# Patient Record
Sex: MALE | Race: WHITE | NOT HISPANIC OR LATINO | Employment: FULL TIME | URBAN - METROPOLITAN AREA
[De-identification: names, ages, dates, MRNs, and addresses within clinical notes are randomized per-mention and may not be internally consistent; named-entity substitution may affect disease eponyms.]

---

## 2023-12-14 ENCOUNTER — EVALUATION (OUTPATIENT)
Dept: OCCUPATIONAL THERAPY | Facility: CLINIC | Age: 58
End: 2023-12-14
Payer: COMMERCIAL

## 2023-12-14 DIAGNOSIS — M79.641 HAND PAIN, RIGHT: Primary | ICD-10-CM

## 2023-12-14 PROBLEM — M25.641 JOINT STIFFNESS OF HAND, RIGHT: Status: ACTIVE | Noted: 2023-12-14

## 2023-12-14 PROCEDURE — 97530 THERAPEUTIC ACTIVITIES: CPT | Mod: GO

## 2023-12-14 PROCEDURE — 97165 OT EVAL LOW COMPLEX 30 MIN: CPT | Mod: GO

## 2023-12-14 PROCEDURE — 97110 THERAPEUTIC EXERCISES: CPT | Mod: GO

## 2023-12-14 NOTE — PROGRESS NOTES
"Occupational Therapy    Evaluation/Treatment    Patient Name: Dwaine Romero  MRN: [id number]  : 1965  Today's Date: 23     Time Calculation  Start Time: 845  Stop Time: 934  Time Calculation (min): 49 min    Assessment:     OT Assessment Results: Decreased ADL status, Decreased upper extremity range of motion, Decreased upper extremity strength, Decreased sensation, Decreased fine motor control, Decreased IADLs  Plan:     Frequency: 1-2 x a week  Duration: 8 weeks  Rehab Potential: Fair  Plan of Care Agreement: Patient    Subjective   Current Problem:  1. Hand pain, right  Follow Up In Occupational Therapy        General:      General  Reason for Referral: Extensor tendon repair zone 3-4  Referred By: Espinoza Posey PA-C  General Comment: R middle finger tenolysis, R MF Extensor tendon repair, R MF radial digital nerve repair, R RF extensor tendon repair, R RF revision amputation. R IF revision amputation. See EMR for further info.  Precautions:  STEADI Fall Risk Score (The score of 4 or more indicates an increased risk of falling): Surgery 23  UE Weight Bearing Status: Right Non-Weight Bearing  Splinting:  (Pt wearing digit extension splint for entire hand, wrist and forearm.)  Precautions Comment: Extensor tendon protocol.    Pain:  Pain Assessment  Pain Assessment:  (No specific number given. \"it doesnt hurt anything like it did.)    Objective   Cognition:WNL              Home Living:  Lives With:  (family)  Home Access: No concerns  Prior Function:  Level of [place]: Independent with ADLs and functional transfers  Hand Dominance: Right  IADL History:  Occupation:  (Saw )  ADL:  Eating Deficit:  (Unable to use both hands for cutting food.)  Bathing Deficit: Increased time to complete  (Compensation.)  UE Dressing Deficit:  (Assist with buttoning)  LE Dressing Deficit: Increased time to complete (Assist to tie shoes)  Functional Deficit: Increased time to complete   "       Therapy/Activity: Therapeutic Exercise  Therapeutic Exercise Activity 1: Pt educated on completing MP flexion with IP's extended and while wrist was slightly extended, then returning to MP extension passively. x 10  Therapeutic Exercise Activity 2: DIP flexion attempted with MP and PIP straight on long finger, however he was unable to move at this time. Will reattempt.  Therapeutic Exercise Activity 3: MP short arch extension over a towel was completed over a towel roll x 10.    Therapeutic Activity  Therapeutic Activity 1: Pt's surgical fingers were debrided of dead tissue with scissors and tweezers. no drainage noted except a small amount of blood following debridement.  Therapeutic Activity 2: Pt briefly educated on scar massage and instructed to complete 3x a day at 15 min each time.  Therapeutic Activity 3: Edema management was discussed and should include elevation, and icing.              Sensation:  Sensation Comment: Sensation impaired  Strength:  Strength Comments: Strength in hand, and wrist impaired.     Extremities: R wrist and hand impaired, L WNL  Outcome Measures: OT Adult Other Outcome Measures  Other Outcome Measures: 100 Dash        OP EDUCATION:  Education  Individual(s) Educated: Patient  Education Provided: Orthotics wearing schedule and precautions, Diagnosis & Precautions, POC discussed and agreed upon, Risk and benefits of OT discussed with patient or other  Home Program: Handout issued, AROM, PROM  Patient Response to Education: Patient/Caregiver Verbalized Understanding of Information    Goals:  Active       OT Problem       Pt will demonstrate in improve in function by improving to 20.0 on the quick dash to signify increased independence with ADL'S with decreased pain.         Start:  12/14/23    Expected End:  03/14/24            Pt will be independent with HEP including wound management, scar management and edema management         Start:  12/14/23    Expected End:  03/14/24             Patient will improve finger ROM in order to perform self-care, household, work and or leisure tasks, Right Finger ROM Goal (Total Active Motion)      Index  125   Degrees, Ring  130      Degrees, Long   160    Degrees, Small   225    Degrees.        Start:  12/14/23    Expected End:  03/14/24            PATIENT WILL REPORT PAIN OF 2/10 DEMONSTRATING A REDUCTION OF OVERALL PAIN       Start:  12/14/23    Expected End:  03/14/24            Patient will improve hand strength in order to perform self-care, household, work and or leisure tasks Right  Hand Strength Goal;     35      #, Key Pinch   10      #       Start:  12/14/23    Expected End:  03/14/24            Patient will improve strengthening in order to perform self-care, household, work and or leisure tasks, Right/Left Wrist Strength Goal   Extension   /5,  Flexion    /5, Ulnar deviation /5, Radial deviation   /5  5/5 globally       Start:  12/14/23    Expected End:  03/14/24            Patient will improve wrist range of motion in order to perform self-care, household, work and or leisure tasks. Wrist Extension 55,   Flexion 55 ,               Start:  12/14/23    Expected End:  03/14/24            PATIENT WILL DEMONSTRATE ABILITY TO FOLLOW extensor tendon repair PRECUATIONS INDEPENDENTLY       Start:  12/14/23    Expected End:  03/14/24

## 2023-12-20 ENCOUNTER — TREATMENT (OUTPATIENT)
Dept: OCCUPATIONAL THERAPY | Facility: CLINIC | Age: 58
End: 2023-12-20
Payer: COMMERCIAL

## 2023-12-20 DIAGNOSIS — M79.641 HAND PAIN, RIGHT: ICD-10-CM

## 2023-12-20 PROCEDURE — 97140 MANUAL THERAPY 1/> REGIONS: CPT | Mod: GO

## 2023-12-20 PROCEDURE — 97110 THERAPEUTIC EXERCISES: CPT | Mod: GO

## 2023-12-21 NOTE — PROGRESS NOTES
Occupational Therapy  Occupational Therapy Treatment    Patient Name: Dwaine Romero  MRN: [id number]  Today's Date: 12/21/2023  Time Calculation  Start Time: 0945  Stop Time: 1028  Time Calculation (min): 43 min    Insurance:  Authorization   No        Visit #- 2    Current Problem  1. Hand pain, right  Follow Up In Occupational Therapy          Precautions  Precautions  UE Weight Bearing Status: Right Non-Weight Bearing  Splinting:  (Pt continue to be wearing digit extension splint for entire hand, wrist and forearm.  Will remove for HEP and awake rest.)  Precautions Comment: Extensor tendon protocol.      Subjective:   Patient reports: See pain assessment    Performing HEP?: Yes    Pain  Pain Assessment:  (No # given, but reports significantly better than it was the week before.  Also reports fingers are moving better.)      Objective:   Physical Observation:   Multiple scars to fingers and hands.  IF wound open to air, granulation tissue (smooth) with contraction noted.  Straps on his splint were falling apart.  Issued replacement, along with stockingnette to go under his splint.    Treatments:       Therapeutic Exercise:  15 min  Therapeutic Exercise  Therapeutic Exercise Activity 1: Therapist demonstrated exercise for completing MP flexion with IP's extended and while wrist was slightly extended, then returning to MP extension ACTIVELY. x 10  Therapeutic Exercise Activity 2: DIP flexion re-attempted with MP and PIP straight on long finger, however he was unable to move at this time. Will reattempt.  Therapeutic Exercise Activity 3: MP short arch extension over a towel  x 10.  Therapeutic Exercise Activity 4: Hand flat on table with active slight lift and move radially, with a 5 second hold, 10.  Therapeutic Exercise Activity 5: Wrist gentle flexion with relaxed fingers, x10, no holds.  Therapeutic Exercise Activity 6: Wrist extension with 5 second hold, x10.    Manual Therapy:    28 min  Manual  "Therapy  Manual Therapy Performed: Yes  Manual Therapy Activity 1: Desensitization techniques instructed, demonstrated and patient understood activities including tapping, vibration and rubbing the hyperrsensitive area on palm, near proximal to IF.  Handout issued, copy scanned.  Manual Therapy Activity 2: [place] for Relaxation fibrosis, secondary to increased edema (remains in all his fingers).    Therapeutic Activity:    min  Therapeutic Activity  Therapeutic Activity 1: Pt's index finger biologic film dressing \"fell off\" per patient.  Granulation tissue, with contraction noted.  Wound is clean, and has scant to zero drainage.  No intervention provided, other than issueing new sleeves for under his orthosis.  Therapeutic Activity 3: Edema management was again discussed and should include elevation, and icing, and even alternating between heat and ice.        Assessment:     Patient completed therapy this date with some difficulty, and is progressing in therapy.  Improvement noted in patient's: Range of motion  Self care  Home management  pain and edema.    Plan:   Plan to continue with current plan of care.       Active       OT Problem       Pt will demonstrate in improve in function by improving to 20.0 on the quick dash to signify increased independence with ADL'S with decreased pain.         Start:  12/14/23    Expected End:  03/14/24            Pt will be independent with HEP including wound management, scar management and edema management         Start:  12/14/23    Expected End:  03/14/24            Patient will improve finger ROM in order to perform self-care, household, work and or leisure tasks, Right Finger ROM Goal (Total Active Motion)      Index  125   Degrees, Ring  130      Degrees, Long   160    Degrees, Small   225    Degrees.        Start:  12/14/23    Expected End:  03/14/24            PATIENT WILL REPORT PAIN OF 2/10 DEMONSTRATING A REDUCTION OF OVERALL PAIN       Start:  12/14/23    Expected " End:  03/14/24            Patient will improve hand strength in order to perform self-care, household, work and or leisure tasks Right  Hand Strength Goal;     35      #, Key Pinch   10      #       Start:  12/14/23    Expected End:  03/14/24            Patient will improve strengthening in order to perform self-care, household, work and or leisure tasks, Right/Left Wrist Strength Goal   Extension   /5,  Flexion    /5, Ulnar deviation /5, Radial deviation   /5  5/5 globally       Start:  12/14/23    Expected End:  03/14/24            Patient will improve wrist range of motion in order to perform self-care, household, work and or leisure tasks. Wrist Extension 55,   Flexion 55 ,               Start:  12/14/23    Expected End:  03/14/24            PATIENT WILL DEMONSTRATE ABILITY TO FOLLOW extensor tendon repair PRECUATIONS INDEPENDENTLY       Start:  12/14/23    Expected End:  03/14/24                  Sirena Napoles OT

## 2024-04-11 ENCOUNTER — DOCUMENTATION (OUTPATIENT)
Dept: OCCUPATIONAL THERAPY | Facility: CLINIC | Age: 59
End: 2024-04-11
Payer: COMMERCIAL

## 2024-04-11 DIAGNOSIS — M79.641 HAND PAIN, RIGHT: Primary | ICD-10-CM

## 2024-04-11 NOTE — PROGRESS NOTES
Occupational Therapy    Occupational Therapy Discharge Report    Patient Name: Dwaine Romero  MRN: [id number]  Today's Date: 4/11/2024           Initial Eval: 12-4-23  Attended Visits: 2  Referring Physician:Dr. Doran   Referring Diagnosis: Extensor Tendon repair.        Areas Addressed in Therapy:  Pt's ROM, hypersensitivity and wound were addressed.        Reason for discharge:      Pt is discharged due to not scheduling further appointments.

## 2024-11-26 ENCOUNTER — TELEPHONE (OUTPATIENT)
Dept: PRIMARY CARE | Facility: CLINIC | Age: 59
End: 2024-11-26
Payer: COMMERCIAL

## 2024-11-26 NOTE — TELEPHONE ENCOUNTER
He was a [facility] patient so can go to [facility] or [facility]- Has not been seen for about 2 years    I believe it was his sister who called and said that he is having some mental issues so wants to get him checked out and started on medication- asked for a phone appt    74

## 2024-11-27 ENCOUNTER — TELEPHONE (OUTPATIENT)
Dept: PRIMARY CARE | Facility: CLINIC | Age: 59
End: 2024-11-27
Payer: COMMERCIAL

## 2024-11-27 NOTE — TELEPHONE ENCOUNTER
Was an appt made for this marco? Because they called again today asking for a appointment but it says that terrell informed them but there is no appt scheduled?

## 2024-12-02 ENCOUNTER — OFFICE VISIT (OUTPATIENT)
Dept: PRIMARY CARE | Facility: CLINIC | Age: 59
End: 2024-12-02
Payer: COMMERCIAL

## 2024-12-02 VITALS
HEART RATE: 102 BPM | HEIGHT: 70 IN | DIASTOLIC BLOOD PRESSURE: 88 MMHG | WEIGHT: 231 LBS | OXYGEN SATURATION: 98 % | SYSTOLIC BLOOD PRESSURE: 138 MMHG | BODY MASS INDEX: 33.07 KG/M2

## 2024-12-02 DIAGNOSIS — F33.41 RECURRENT MAJOR DEPRESSIVE DISORDER, IN PARTIAL REMISSION (CMS-HCC): Primary | ICD-10-CM

## 2024-12-02 PROBLEM — S68.624A PARTIAL TRAUMATIC TRANSPHALANGEAL AMPUTATION OF RIGHT RING FINGER: Status: RESOLVED | Noted: 2023-11-13 | Resolved: 2024-12-02

## 2024-12-02 PROBLEM — S68.620A PARTIAL TRAUMATIC TRANSPHALANGEAL AMPUTATION OF RIGHT INDEX FINGER: Status: RESOLVED | Noted: 2023-11-13 | Resolved: 2024-12-02

## 2024-12-02 PROBLEM — K13.0 MUCOCELE OF LIP: Status: RESOLVED | Noted: 2024-12-02 | Resolved: 2024-12-02

## 2024-12-02 PROCEDURE — 99213 OFFICE O/P EST LOW 20 MIN: CPT | Performed by: FAMILY MEDICINE

## 2024-12-02 PROCEDURE — 3008F BODY MASS INDEX DOCD: CPT | Performed by: FAMILY MEDICINE

## 2024-12-02 RX ORDER — FLUOXETINE 10 MG/1
10 CAPSULE ORAL DAILY
Qty: 30 CAPSULE | Refills: 8 | Status: SHIPPED | OUTPATIENT
Start: 2024-12-02 | End: 2025-08-29

## 2024-12-02 NOTE — PROGRESS NOTES
"Subjective   Patient ID: Dwaine Romero is a 59 y.o. male who presents for Depression (Here to discuss some depression has a lot going on in his life ).  HPI  Has a lot going on in life  1 year ago son tried to commit suicide (shot himself)  History of depression episodes but went away in 6 months  Got worse the last week- son got very low again and in counseling facility    Sleep- decreased  Sadness- a lot  Apathy- has some  Hopeless- had some  Worthless- maybe some  Some feelings of guilt  Concentration- normal  Energy- normal  Appetite- decreased with weight loss  SI- none  HI- none  Thought Process-  no racing  Inna- none    No CP, SOB, palpitations, n/v, diarrhea      Current Outpatient Medications:     FLUoxetine (PROzac) 10 mg capsule, Take 1 capsule (10 mg) by mouth once daily., Disp: 30 capsule, Rfl: 8   Past Surgical History:   Procedure Laterality Date    OTHER SURGICAL HISTORY  08/01/2014    Complex Repair Of Wound Arms      Past Medical History:   Diagnosis Date    Mucocele of lip 12/02/2024    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   Review of Systems    Objective   /88   Pulse 102   Ht 1.765 m (5' 9.5\")   Wt 105 kg (231 lb)   SpO2 98%   BMI 33.62 kg/m²    Physical Exam  Vitals and nursing note reviewed.   Constitutional:       General: He is not in acute distress.     Appearance: Normal appearance. He is not ill-appearing.   HENT:      Head: Normocephalic and atraumatic.   Eyes:      Extraocular Movements: Extraocular movements intact.      Conjunctiva/sclera: Conjunctivae normal.      Pupils: Pupils are equal, round, and reactive to light.   Neck:      Vascular: No carotid bruit. "   Cardiovascular:      Rate and Rhythm: Normal rate and regular rhythm.      Pulses: Normal pulses.      Heart sounds: Normal heart sounds.   Pulmonary:      Effort: Pulmonary effort is normal.      Breath sounds: Normal breath sounds.   Abdominal:      General: Abdomen is flat. Bowel sounds are normal.      Palpations: Abdomen is soft. There is no mass.   Musculoskeletal:      Cervical back: Normal range of motion and neck supple.   Lymphadenopathy:      Cervical: No cervical adenopathy.   Skin:     Capillary Refill: Capillary refill takes less than 2 seconds.   Neurological:      General: No focal deficit present.      Mental Status: He is alert and oriented to person, place, and time.   Psychiatric:         Mood and Affect: Mood normal.         Behavior: Behavior normal.         Assessment/Plan   Problem List Items Addressed This Visit    None  Visit Diagnoses       Recurrent major depressive disorder, in partial remission (CMS-HCC)    -  Primary    Relevant Medications    FLUoxetine (PROzac) 10 mg capsule        Not interested in counseling at this time  CV exercise    Patient understands and agrees with treatment plan    Mario Schultz, DO

## 2024-12-03 ENCOUNTER — APPOINTMENT (OUTPATIENT)
Dept: PRIMARY CARE | Facility: CLINIC | Age: 59
End: 2024-12-03
Payer: COMMERCIAL

## 2025-02-12 ENCOUNTER — APPOINTMENT (OUTPATIENT)
Dept: PRIMARY CARE | Facility: CLINIC | Age: 60
End: 2025-02-12
Payer: COMMERCIAL

## 2025-06-10 ENCOUNTER — APPOINTMENT (OUTPATIENT)
Dept: PRIMARY CARE | Facility: CLINIC | Age: 60
End: 2025-06-10
Payer: COMMERCIAL